# Patient Record
Sex: FEMALE | Race: WHITE | NOT HISPANIC OR LATINO | Employment: FULL TIME | URBAN - METROPOLITAN AREA
[De-identification: names, ages, dates, MRNs, and addresses within clinical notes are randomized per-mention and may not be internally consistent; named-entity substitution may affect disease eponyms.]

---

## 2020-06-15 ENCOUNTER — OFFICE VISIT (OUTPATIENT)
Dept: FAMILY MEDICINE CLINIC | Facility: CLINIC | Age: 35
End: 2020-06-15

## 2020-06-15 VITALS
HEIGHT: 64 IN | WEIGHT: 120 LBS | DIASTOLIC BLOOD PRESSURE: 68 MMHG | BODY MASS INDEX: 20.49 KG/M2 | TEMPERATURE: 97.8 F | SYSTOLIC BLOOD PRESSURE: 108 MMHG | HEART RATE: 50 BPM | OXYGEN SATURATION: 99 %

## 2020-06-15 DIAGNOSIS — E55.9 HYPOVITAMINOSIS D: ICD-10-CM

## 2020-06-15 DIAGNOSIS — Z13.1 SCREENING FOR DIABETES MELLITUS: ICD-10-CM

## 2020-06-15 DIAGNOSIS — N92.6 ABNORMAL MENSTRUAL CYCLE: ICD-10-CM

## 2020-06-15 DIAGNOSIS — Z13.0 SCREENING FOR DEFICIENCY ANEMIA: ICD-10-CM

## 2020-06-15 DIAGNOSIS — Z13.220 SCREENING FOR LIPID DISORDERS: ICD-10-CM

## 2020-06-15 DIAGNOSIS — Z00.00 HEALTH MAINTENANCE EXAMINATION: Primary | ICD-10-CM

## 2020-06-15 PROBLEM — K21.9 GASTROESOPHAGEAL REFLUX DISEASE WITHOUT ESOPHAGITIS: Status: ACTIVE | Noted: 2020-06-15

## 2020-06-15 PROCEDURE — 99395 PREV VISIT EST AGE 18-39: CPT | Performed by: FAMILY MEDICINE

## 2020-06-15 NOTE — PROGRESS NOTES
Bibiana Magana is a 35 y.o. female.     Chief Complaint   Patient presents with   • Establish Care     new pt establishing today with dr angela    • Annual Exam     no complains        HPI     Pt is a pleasant 35 y.o. YO female here for Annual exam.     Health Maintenance   Topic Date Due   • ANNUAL PHYSICAL  04/12/1989   • TDAP/TD VACCINES (1 - Tdap) 04/12/1997   • HEPATITIS C SCREENING  06/15/2020   • PAP SMEAR  06/15/2021 (Originally 6/15/2020)   • INFLUENZA VACCINE  08/01/2020       Diet: Eating healthy, some sweets.   Exercise: Exercising 3 days a week   GYN: Never been sexually active  Immunizations: UTD    The following portions of the patient's history were reviewed and updated as appropriate: allergies, current medications, past family history, past medical history, past social history, past surgical history and problem list.    Review of Systems   Constitutional: Negative.  Negative for activity change, appetite change, chills, diaphoresis, fatigue, fever and unexpected weight change.   HENT: Negative.  Negative for congestion and dental problem.    Eyes: Negative.  Negative for pain, discharge and itching.   Respiratory: Negative.  Negative for apnea and chest tightness.    Cardiovascular: Negative.  Negative for chest pain and leg swelling.   Gastrointestinal: Negative.    Endocrine: Negative.  Negative for cold intolerance, heat intolerance, polydipsia and polyphagia.   Genitourinary: Negative.  Negative for difficulty urinating and dyspareunia.   Musculoskeletal: Negative.  Negative for arthralgias and back pain.   Skin: Negative.  Negative for color change and pallor.   Allergic/Immunologic: Negative.  Negative for environmental allergies, food allergies and immunocompromised state.   Neurological: Negative.  Negative for dizziness and facial asymmetry.   Hematological: Negative.  Negative for adenopathy. Does not bruise/bleed easily.   Psychiatric/Behavioral: Negative.        Objective  Vitals:     06/15/20 1323   BP: 108/68   Pulse: 50   Temp: 97.8 °F (36.6 °C)   SpO2: 99%        Physical Exam   Constitutional: She is oriented to person, place, and time. She appears well-developed and well-nourished. No distress.   HENT:   Head: Normocephalic.   Nose: Nose normal.   Eyes: EOM are normal.   Cardiovascular: Normal rate, regular rhythm, normal heart sounds and intact distal pulses.   No murmur heard.  Pulmonary/Chest: Effort normal and breath sounds normal. No respiratory distress.   Musculoskeletal: Normal range of motion.   Neurological: She is alert and oriented to person, place, and time.   Skin: Skin is warm and dry. No rash noted.   Psychiatric: She has a normal mood and affect. Her behavior is normal. Judgment and thought content normal.   Nursing note and vitals reviewed.      No current outpatient medications on file.    Procedures    Lab Results (most recent)     None              Bibiana was seen today for establish care and annual exam.    Diagnoses and all orders for this visit:    Health maintenance examination    Screening for diabetes mellitus  -     Comprehensive Metabolic Panel    Screening for lipid disorders  -     Lipid Panel    Hypovitaminosis D  -     Vitamin D 25 Hydroxy    Screening for deficiency anemia  -     CBC Auto Differential    Abnormal menstrual cycle  -     CBC Auto Differential  -     TSH  -     T4, Free      Here for annual exam, fasting labs ordered as above, immunizations up-to-date, colon cancer screening not indicated, GYN health normal menstrual history with recent changes to her menstrual cycle that are less frequent and lighter.  However never been sexually active.  Would like to wait for Pap smear at this time, cardiovascular screening negative for symptoms, counseled patient to increase vegetable intake, water and 150 minutes of exercise weekly combining weightbearing exercises and aerobic activity.    Return in about 2 years (around 6/15/2022), or if symptoms worsen  or fail to improve, for Annual physical.      Shruthi Medina MD

## 2023-03-20 DIAGNOSIS — J34.89 SINUS DRAINAGE: Primary | ICD-10-CM

## 2023-03-20 RX ORDER — PREDNISONE 20 MG/1
40 TABLET ORAL DAILY
Qty: 10 TABLET | Refills: 0 | Status: SHIPPED | OUTPATIENT
Start: 2023-03-20 | End: 2023-03-25

## 2023-03-20 RX ORDER — AMOXICILLIN AND CLAVULANATE POTASSIUM 875; 125 MG/1; MG/1
1 TABLET, FILM COATED ORAL 2 TIMES DAILY
Qty: 20 TABLET | Refills: 0 | Status: SHIPPED | OUTPATIENT
Start: 2023-03-20 | End: 2023-03-30

## 2025-02-13 ENCOUNTER — OFFICE VISIT (OUTPATIENT)
Dept: FAMILY MEDICINE CLINIC | Facility: CLINIC | Age: 40
End: 2025-02-13
Payer: COMMERCIAL

## 2025-02-13 ENCOUNTER — TELEPHONE (OUTPATIENT)
Dept: FAMILY MEDICINE CLINIC | Facility: CLINIC | Age: 40
End: 2025-02-13
Payer: COMMERCIAL

## 2025-02-13 VITALS
WEIGHT: 143 LBS | OXYGEN SATURATION: 100 % | HEART RATE: 82 BPM | DIASTOLIC BLOOD PRESSURE: 78 MMHG | HEIGHT: 64 IN | BODY MASS INDEX: 24.41 KG/M2 | SYSTOLIC BLOOD PRESSURE: 110 MMHG | TEMPERATURE: 97.5 F

## 2025-02-13 DIAGNOSIS — R05.1 ACUTE COUGH: ICD-10-CM

## 2025-02-13 DIAGNOSIS — J01.00 ACUTE NON-RECURRENT MAXILLARY SINUSITIS: Primary | ICD-10-CM

## 2025-02-13 LAB
EXPIRATION DATE: NORMAL
FLUAV AG UPPER RESP QL IA.RAPID: NOT DETECTED
FLUBV AG UPPER RESP QL IA.RAPID: NOT DETECTED
INTERNAL CONTROL: NORMAL
Lab: NORMAL
SARS-COV-2 AG UPPER RESP QL IA.RAPID: NOT DETECTED

## 2025-02-13 PROCEDURE — 99203 OFFICE O/P NEW LOW 30 MIN: CPT | Performed by: FAMILY MEDICINE

## 2025-02-13 PROCEDURE — 87428 SARSCOV & INF VIR A&B AG IA: CPT | Performed by: FAMILY MEDICINE

## 2025-02-13 RX ORDER — BENZONATATE 100 MG/1
100 CAPSULE ORAL 3 TIMES DAILY PRN
Qty: 30 CAPSULE | Refills: 2 | Status: SHIPPED | OUTPATIENT
Start: 2025-02-13

## 2025-02-13 NOTE — PROGRESS NOTES
"Chief Complaint  Sinus Problem (Sinus congestions  for couple days would like some advise )    Subjective        Bibiana Magana presents to [facility]  History of Present Illness  Pleasant 39-year-old female who to follow-up for sinusitis has been present for 7 days, mostly over the maxillary sinus, no sore throat but she does have a  cough, no fever, no shortness of breath.    Objective   Vital Signs:  /78   Pulse 82   Temp 97.5 °F (36.4 °C)   Ht 162.6 cm (64\")   Wt 64.9 kg (143 lb)   SpO2 100%   BMI 24.55 kg/m²   Estimated body mass index is 24.55 kg/m² as calculated from the following:    Height as of this encounter: 162.6 cm (64\").    Weight as of this encounter: 64.9 kg (143 lb).    BMI is within normal parameters. No other follow-up for BMI required.      Physical Exam  Vitals and nursing note reviewed.   Constitutional:       General: She is not in acute distress.     Appearance: She is well-developed.   HENT:      Head: Normocephalic.      Ears:      Comments: No tenderness to the sinuses currently, mild fluid accumulation on the left ear     Nose: Nose normal.   Cardiovascular:      Rate and Rhythm: Normal rate and regular rhythm.      Heart sounds: Normal heart sounds. No murmur heard.  Pulmonary:      Effort: Pulmonary effort is normal. No respiratory distress.      Breath sounds: Normal breath sounds.   Musculoskeletal:         General: Normal range of motion.   Skin:     General: Skin is warm and dry.      Findings: No rash.   Neurological:      Mental Status: She is alert and oriented to person, place, and time.   Psychiatric:         Behavior: Behavior normal.         Thought Content: Thought content normal.         Judgment: Judgment normal.        Result Review :             Office Visit on 02/13/2025   Component Date Value Ref Range Status    SARS Antigen 02/13/2025 Not Detected  Not Detected, Presumptive Negative Final    Influenza A Antigen OTTONIEL " 02/13/2025 Not Detected  Not Detected Final    Influenza B Antigen OTTONIEL 02/13/2025 Not Detected  Not Detected Final    Internal Control 02/13/2025 Passed  Passed Final    Lot Number 02/13/2025 [id number]   Final    Expiration Date 02/13/2025 2/26   Final          Assessment and Plan   Diagnoses and all orders for this visit:    1. Acute non-recurrent maxillary sinusitis (Primary)  -     POCT SARS-CoV-2 Antigen OTTONIEL + Flu    2. Acute cough  -     benzonatate (Tessalon Perles) 100 MG capsule; Take 1 capsule by mouth 3 (Three) Times a Day As Needed for Cough.  Dispense: 30 capsule; Refill: 2    7 days of sinusitis, continue with supportive care okay to use Tessalon Perles as needed continue with guaifenesin, Flonase and saline as needed, if not improving next week okay to call for prescription of Augmentin.         Follow Up   Return in about 4 months (around 6/2/2025), or if symptoms worsen or fail to improve, for Annual Physical with fasting labs prior.  Patient was given instructions and counseling regarding her condition or for health maintenance advice. Please see specific information pulled into the AVS if appropriate.

## 2025-02-13 NOTE — TELEPHONE ENCOUNTER
Pt stated she is a friend of yours and you informed her to call to schedule. She has not been seen since 06/15/2020. The protocol states that pt's need to be seen at minimum once every 3 years or they would be considered a new patient. Did you want to make her a new pt appointment or just schedule her?

## 2025-05-30 DIAGNOSIS — Z13.6 SCREENING, HEART DISEASE, ISCHEMIC: ICD-10-CM

## 2025-05-30 DIAGNOSIS — Z13.29 SCREENING FOR THYROID DISORDER: Primary | ICD-10-CM

## 2025-05-30 DIAGNOSIS — Z00.00 HEALTH CARE MAINTENANCE: ICD-10-CM

## 2025-05-30 DIAGNOSIS — Z13.1 SCREENING FOR DIABETES MELLITUS: ICD-10-CM

## 2025-05-30 DIAGNOSIS — Z13.220 SCREENING FOR LIPID DISORDERS: ICD-10-CM

## 2025-06-02 LAB
ALBUMIN SERPL-MCNC: 4.5 G/DL (ref 3.5–5.2)
ALBUMIN/GLOB SERPL: 1.6 G/DL
ALP SERPL-CCNC: 65 U/L (ref 39–117)
ALT SERPL-CCNC: 9 U/L (ref 1–33)
AST SERPL-CCNC: 13 U/L (ref 1–32)
BASOPHILS # BLD AUTO: 0.06 10*3/MM3 (ref 0–0.2)
BASOPHILS NFR BLD AUTO: 0.7 % (ref 0–1.5)
BILIRUB SERPL-MCNC: 0.6 MG/DL (ref 0–1.2)
BUN SERPL-MCNC: 11 MG/DL (ref 6–20)
BUN/CREAT SERPL: 15.9 (ref 7–25)
CALCIUM SERPL-MCNC: 9.4 MG/DL (ref 8.6–10.5)
CHLORIDE SERPL-SCNC: 104 MMOL/L (ref 98–107)
CHOLEST SERPL-MCNC: 196 MG/DL (ref 0–200)
CO2 SERPL-SCNC: 22.3 MMOL/L (ref 22–29)
CREAT SERPL-MCNC: 0.69 MG/DL (ref 0.57–1)
EGFRCR SERPLBLD CKD-EPI 2021: 113.4 ML/MIN/1.73
EOSINOPHIL # BLD AUTO: 0.05 10*3/MM3 (ref 0–0.4)
EOSINOPHIL NFR BLD AUTO: 0.6 % (ref 0.3–6.2)
ERYTHROCYTE [DISTWIDTH] IN BLOOD BY AUTOMATED COUNT: 12.7 % (ref 12.3–15.4)
GLOBULIN SER CALC-MCNC: 2.9 GM/DL
GLUCOSE SERPL-MCNC: 87 MG/DL (ref 65–99)
HCT VFR BLD AUTO: 39.1 % (ref 34–46.6)
HDLC SERPL-MCNC: 61 MG/DL (ref 40–60)
HGB BLD-MCNC: 12.7 G/DL (ref 12–15.9)
IMM GRANULOCYTES # BLD AUTO: 0.05 10*3/MM3 (ref 0–0.05)
IMM GRANULOCYTES NFR BLD AUTO: 0.6 % (ref 0–0.5)
LDLC SERPL CALC-MCNC: 125 MG/DL (ref 0–100)
LDLC/HDLC SERPL: 2.03 {RATIO}
LYMPHOCYTES # BLD AUTO: 2.49 10*3/MM3 (ref 0.7–3.1)
LYMPHOCYTES NFR BLD AUTO: 29.5 % (ref 19.6–45.3)
MCH RBC QN AUTO: 29.6 PG (ref 26.6–33)
MCHC RBC AUTO-ENTMCNC: 32.5 G/DL (ref 31.5–35.7)
MCV RBC AUTO: 91.1 FL (ref 79–97)
MONOCYTES # BLD AUTO: 0.59 10*3/MM3 (ref 0.1–0.9)
MONOCYTES NFR BLD AUTO: 7 % (ref 5–12)
NEUTROPHILS # BLD AUTO: 5.2 10*3/MM3 (ref 1.7–7)
NEUTROPHILS NFR BLD AUTO: 61.6 % (ref 42.7–76)
NRBC BLD AUTO-RTO: 0 /100 WBC (ref 0–0.2)
PLATELET # BLD AUTO: 397 10*3/MM3 (ref 140–450)
POTASSIUM SERPL-SCNC: 4.9 MMOL/L (ref 3.5–5.2)
PROT SERPL-MCNC: 7.4 G/DL (ref 6–8.5)
RBC # BLD AUTO: 4.29 10*6/MM3 (ref 3.77–5.28)
SODIUM SERPL-SCNC: 137 MMOL/L (ref 136–145)
TRIGL SERPL-MCNC: 55 MG/DL (ref 0–150)
TSH SERPL DL<=0.005 MIU/L-ACNC: 1.47 UIU/ML (ref 0.27–4.2)
VLDLC SERPL CALC-MCNC: 10 MG/DL (ref 5–40)
WBC # BLD AUTO: 8.44 10*3/MM3 (ref 3.4–10.8)

## 2025-06-06 ENCOUNTER — OFFICE VISIT (OUTPATIENT)
Dept: FAMILY MEDICINE CLINIC | Facility: CLINIC | Age: 40
End: 2025-06-06
Payer: COMMERCIAL

## 2025-06-06 VITALS
WEIGHT: 136 LBS | SYSTOLIC BLOOD PRESSURE: 106 MMHG | OXYGEN SATURATION: 100 % | HEART RATE: 78 BPM | BODY MASS INDEX: 23.22 KG/M2 | TEMPERATURE: 97.8 F | HEIGHT: 64 IN | DIASTOLIC BLOOD PRESSURE: 68 MMHG

## 2025-06-06 DIAGNOSIS — Z13.29 SCREENING FOR THYROID DISORDER: ICD-10-CM

## 2025-06-06 DIAGNOSIS — Z11.59 ENCOUNTER FOR HEPATITIS C SCREENING TEST FOR LOW RISK PATIENT: ICD-10-CM

## 2025-06-06 DIAGNOSIS — Z13.1 SCREENING FOR DIABETES MELLITUS: ICD-10-CM

## 2025-06-06 DIAGNOSIS — Z13.220 SCREENING FOR LIPID DISORDERS: ICD-10-CM

## 2025-06-06 DIAGNOSIS — Z13.0 SCREENING, ANEMIA, DEFICIENCY, IRON: ICD-10-CM

## 2025-06-06 DIAGNOSIS — Z00.00 HEALTH MAINTENANCE EXAMINATION: Primary | ICD-10-CM

## 2025-06-06 PROCEDURE — 99395 PREV VISIT EST AGE 18-39: CPT | Performed by: FAMILY MEDICINE

## 2025-06-06 NOTE — PROGRESS NOTES
Chief Complaint  Annual Exam (Doing well no complains had labs prior apt )    Subjective        Bibiana Magana presents to [facility]  History of Present Illness  History of Present Illness  The patient presents for a annual physical.    Dietary Changes and Physical Health  She reports overall improvement in physical health due to dietary changes, including increased protein intake and a balanced diet. Regular bowel movements occur daily.  - Character: Overall improvement in physical health, regular bowel movements.    Sleep Pattern  Sleep pattern is satisfactory, with occasional early morning awakenings at 4:30 AM due to schedule changes.  - Onset: Occasional early morning awakenings.  - Timing: Early morning awakenings at 4:30 AM.  - Severity: Satisfactory sleep pattern.    Anxiety Management  She manages anxiety related to current life circumstances with 5 mg melatonin tablets.  - Character: Anxiety related to current life circumstances.  - Alleviating Factors: 5 mg melatonin tablets.    Menstrual Cycles  Menstrual cycles are regular, with cramping on the first day managed with ibuprofen. Menstrual bleeding lasts approximately 4 days.  - Onset: Cramping on the first day of menstrual cycle.  - Duration: Menstrual bleeding lasts approximately 4 days.  - Character: Regular menstrual cycles with cramping.  - Alleviating Factors: Ibuprofen for cramping.    Weight Changes  She monitors caloric intake but is inconsistent with tracking. She recalls a period of anxiety and depression with a weight of 110 pounds, later gaining weight to over 140 pounds. Current weight is approximately 133 pounds, with a goal of 120 pounds.  - Character: Weight changes associated with anxiety and depression.  - Severity: Current weight approximately 133 pounds, goal of 120 pounds.    Skin Concerns  She has noticed moles on her stomach and back for examination.  - Location: Moles on stomach and  "back.    Additional Information  She has never undergone a Pap smear and is considering supplements.    FAMILY HISTORY  - Father has high cholesterol       Objective   Vital Signs:  /68   Pulse 78   Temp 97.8 °F (36.6 °C)   Ht 162.6 cm (64\")   Wt 61.7 kg (136 lb)   SpO2 100%   BMI 23.34 kg/m²   Estimated body mass index is 23.34 kg/m² as calculated from the following:    Height as of this encounter: 162.6 cm (64\").    Weight as of this encounter: 61.7 kg (136 lb).    BMI is within normal parameters. No other follow-up for BMI required.      Physical Exam  Vitals and nursing note reviewed.   Constitutional:       General: She is not in acute distress.     Appearance: Normal appearance. She is well-developed.   HENT:      Head: Normocephalic.      Right Ear: Tympanic membrane and ear canal normal. There is no impacted cerumen.      Left Ear: Tympanic membrane and ear canal normal. There is no impacted cerumen.      Nose: Nose normal.   Eyes:      Conjunctiva/sclera: Conjunctivae normal.      Pupils: Pupils are equal, round, and reactive to light.   Neck:      Vascular: No carotid bruit.   Cardiovascular:      Rate and Rhythm: Normal rate and regular rhythm.      Heart sounds: Normal heart sounds. No murmur heard.  Pulmonary:      Effort: Pulmonary effort is normal. No respiratory distress.      Breath sounds: Normal breath sounds.   Abdominal:      General: Abdomen is flat. Bowel sounds are normal. There is no distension.      Tenderness: There is no abdominal tenderness.   Musculoskeletal:         General: Normal range of motion.   Skin:     General: Skin is warm and dry.      Findings: No rash.   Neurological:      Mental Status: She is alert and oriented to person, place, and time.   Psychiatric:         Behavior: Behavior normal.         Thought Content: Thought content normal.         Judgment: Judgment normal.        Sleeping well overall, occasional anxiety but mild    Result Review :  The following " data was reviewed by: Shruthi Weber MD on 06/06/2025:         Lipid Panel With LDL / HDL Ratio (06/02/2025 10:00)  Comprehensive Metabolic Panel (06/02/2025 10:00)  CBC & Differential (06/02/2025 10:00)  TSH (06/02/2025 10:00)       Assessment and Plan   Diagnoses and all orders for this visit:    1. Health maintenance examination (Primary)    2. Screening for thyroid disorder  -     TSH Rfx On Abnormal To Free T4; Future    3. Screening for lipid disorders  -     Lipid Panel With LDL / HDL Ratio; Future    4. Screening for diabetes mellitus  -     Comprehensive Metabolic Panel; Future  -     Hemoglobin A1c; Future    5. Screening, anemia, deficiency, iron  -     CBC & Differential; Future    6. Encounter for hepatitis C screening test for low risk patient  -     Hepatitis C Antibody; Future         Assessment & Plan  1. Health maintenance  - Sleep quality is good, occasional anxiety with early onset weakening but not problematic.  Okay to use rare melatonin 3 mg/Benadryl.  - Regular menstrual cycles are positive  - Weight is healthy, metabolism is good  - Labs: normal except slightly elevated total cholesterol (likely genetic) and potassium   -Counseled patient balanced diet and regular exercise, good protein intake  - Advised to monitor waist circumference (<30 inches)  - Skin tags and hemangiomas are benign  - Recommended sunscreen use and avoiding excessive sun exposure  - Suggested Pap smear and hepatitis C screening  - Advised creatine supplements (3-4 g/day) for muscle recovery and weight management  - Recommended consulting Dr. Ashley Barbosa for an exercise program tailored to menstrual cycle  - Advised Tdap vaccine, will return at a later date, okay to schedule nurse appointment.      Follow Up   Return in about 1 year (around 6/6/2026), or if symptoms worsen or fail to improve, for Annual Physical with fasting labs prior.  Patient was given instructions and counseling regarding her condition or for health  maintenance advice. Please see specific information pulled into the AVS if appropriate.       Shruthi Weber MD      Patient or patient representative verbalized consent for the use of Ambient Listening during the visit with  Shruthi Weber MD for chart documentation. 6/6/2025  09:56 EDT

## 2025-06-12 ENCOUNTER — CLINICAL SUPPORT (OUTPATIENT)
Dept: FAMILY MEDICINE CLINIC | Facility: CLINIC | Age: 40
End: 2025-06-12
Payer: COMMERCIAL

## 2025-06-12 DIAGNOSIS — Z23 NEED FOR DIPHTHERIA-TETANUS-PERTUSSIS (TDAP) VACCINE: Primary | ICD-10-CM

## 2025-06-12 PROCEDURE — 90715 TDAP VACCINE 7 YRS/> IM: CPT | Performed by: FAMILY MEDICINE

## 2025-06-12 PROCEDURE — 90471 IMMUNIZATION ADMIN: CPT | Performed by: FAMILY MEDICINE
